# Patient Record
Sex: MALE | URBAN - METROPOLITAN AREA
[De-identification: names, ages, dates, MRNs, and addresses within clinical notes are randomized per-mention and may not be internally consistent; named-entity substitution may affect disease eponyms.]

---

## 2019-05-01 ENCOUNTER — IMPORTED ENCOUNTER (OUTPATIENT)
Dept: URBAN - METROPOLITAN AREA CLINIC 9 | Facility: CLINIC | Age: 69
End: 2019-05-01

## 2019-05-15 ENCOUNTER — IMPORTED ENCOUNTER (OUTPATIENT)
Dept: URBAN - METROPOLITAN AREA CLINIC 9 | Facility: CLINIC | Age: 69
End: 2019-05-15

## 2019-06-19 ENCOUNTER — IMPORTED ENCOUNTER (OUTPATIENT)
Dept: URBAN - METROPOLITAN AREA CLINIC 9 | Facility: CLINIC | Age: 69
End: 2019-06-19

## 2019-07-08 ENCOUNTER — IMPORTED ENCOUNTER (OUTPATIENT)
Dept: URBAN - METROPOLITAN AREA CLINIC 9 | Facility: CLINIC | Age: 69
End: 2019-07-08

## 2019-07-23 NOTE — PATIENT DISCUSSION
***8/7/19 The patient has decided on their goal for surgery. The patient elects Custom Vision OD, goal of emmetropia. ASirmons***.

## 2019-07-23 NOTE — PATIENT DISCUSSION
Custom monovision helps to lessen the dependence on glasses, but is a compromise and glasses are often still needed for some activities including driving, binocular vision, and intermediate area activities. The patient understands there is a possibility they may need an enhancement after surgery. The patient will decide between Basic and Custom Vision OD, goal of emmetropia.

## 2019-07-23 NOTE — PATIENT DISCUSSION
***8/7/19 The patient has decided on their goal for surgery. Reassess for Custom Vision OS, goal of -2.00. ASirmons***.

## 2019-07-23 NOTE — PATIENT DISCUSSION
The patient was informed that with the Basic option, they will most likely need prescription glasses at all focal points after surgery.

## 2019-08-20 NOTE — PATIENT DISCUSSION
ok to proceed with IOL OS due to FD dec for Sx OS made today with [facility] and goal is CV OS, goal of -2.00.  ed monoV is NOT binocular vision but typically yields a high degree of glasses freedom.

## 2019-08-20 NOTE — PATIENT DISCUSSION
Patient advised of the right to post-operative care by the surgeon. Patient is fully informed of, and agreed to, co-management with their primary optometric physician. Post-operative care by the surgeon is not medically necessary and co-management is clinically appropriate. Patient has received itemization of fees related to cataract surgery. Transfer of care letter completed for the patient. Transfer care of left eye to Dr. Reji Koch on 8/20/19. Patient instructed to call immediately if any new distortion, blurring, decreased vision or eye pain.

## 2020-07-07 ENCOUNTER — IMPORTED ENCOUNTER (OUTPATIENT)
Dept: URBAN - METROPOLITAN AREA CLINIC 9 | Facility: CLINIC | Age: 70
End: 2020-07-07

## 2021-04-13 ENCOUNTER — IMPORTED ENCOUNTER (OUTPATIENT)
Dept: URBAN - METROPOLITAN AREA CLINIC 9 | Facility: CLINIC | Age: 71
End: 2021-04-13

## 2021-10-16 ASSESSMENT — VISUAL ACUITY
OD_CC: 20/60 SN
OD_SC: 20/25 - SN
OS_CC: 20/40 SN
OS_CC: 20/20 - SN
OD_CC: 20/30 SN
OD_SC: 20/30 - SN
OD_CC: 20/40 SN
OD_CC: 20/50 - SN
OS_SC: 20/30 - SN
OD_SC: 20/40 - SN
OS_SC: 20/20 - SN
OS_CC: 20/25 SN
OD_CC: 20/20 -2 SN
OD_CC: 20/25 -2 SN
OS_SC: 20/20 - SN
OD_SC: 20/30 SN

## 2021-10-16 ASSESSMENT — KERATOMETRY
OD_AXISANGLE_DEGREES: 31
OD_K2POWER_DIOPTERS: 44.5
OS_AXISANGLE2_DEGREES: 88
OS_K2POWER_DIOPTERS: 44.75
OD_AXISANGLE2_DEGREES: 100
OD_K2POWER_DIOPTERS: 43.75
OD_AXISANGLE_DEGREES: 10
OS_K1POWER_DIOPTERS: 43.75
OS_AXISANGLE_DEGREES: 178
OS_AXISANGLE_DEGREES: 173
OD_K1POWER_DIOPTERS: 43.5
OD_AXISANGLE2_DEGREES: 121
OS_K1POWER_DIOPTERS: 43.25
OS_AXISANGLE2_DEGREES: 83
OS_K2POWER_DIOPTERS: 44
OD_K1POWER_DIOPTERS: 44

## 2021-10-16 ASSESSMENT — TONOMETRY
OD_IOP_MMHG: 12
OS_IOP_MMHG: 13
OS_IOP_MMHG: 11
OD_IOP_MMHG: 12
OS_IOP_MMHG: 9
OD_IOP_MMHG: 12

## 2022-01-27 NOTE — PATIENT DISCUSSION
Patient understands there is an increased risk of corneal edema after cataract surgery. Sent patient rounding message via [employer].

## 2022-07-01 RX ORDER — DILTIAZEM HYDROCHLORIDE 120 MG/1
1 CAPSULE, COATED, EXTENDED RELEASE ORAL
COMMUNITY

## 2022-07-01 RX ORDER — IBUPROFEN 200 MG
TABLET ORAL
COMMUNITY

## 2022-07-01 RX ORDER — SULFAMETHOXAZOLE AND TRIMETHOPRIM 800; 160 MG/1; MG/1
TABLET ORAL
COMMUNITY